# Patient Record
Sex: MALE | Race: AMERICAN INDIAN OR ALASKA NATIVE
[De-identification: names, ages, dates, MRNs, and addresses within clinical notes are randomized per-mention and may not be internally consistent; named-entity substitution may affect disease eponyms.]

---

## 2018-06-30 ENCOUNTER — HOSPITAL ENCOUNTER (EMERGENCY)
Dept: HOSPITAL 42 - ED | Age: 23
Discharge: HOME | End: 2018-06-30
Payer: MEDICAID

## 2018-06-30 VITALS — DIASTOLIC BLOOD PRESSURE: 60 MMHG | HEART RATE: 60 BPM | SYSTOLIC BLOOD PRESSURE: 125 MMHG | OXYGEN SATURATION: 100 %

## 2018-06-30 VITALS — BODY MASS INDEX: 23.7 KG/M2

## 2018-06-30 VITALS — RESPIRATION RATE: 18 BRPM | TEMPERATURE: 98.5 F

## 2018-06-30 DIAGNOSIS — S40.811A: ICD-10-CM
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
## 2018-06-30 DIAGNOSIS — M25.511: ICD-10-CM

## 2018-06-30 DIAGNOSIS — M25.531: Primary | ICD-10-CM

## 2018-06-30 DIAGNOSIS — M54.5: ICD-10-CM
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
## 2018-06-30 DIAGNOSIS — Z23: ICD-10-CM

## 2018-06-30 NOTE — ED PDOC
Arrival/HPI





- General


Chief Complaint: Trauma


Time Seen by Provider: 06/30/18 16:19


Historian: Patient





- History of Present Illness


Narrative History of Present Illness (Text): 





06/30/18 18:05


22yr old male presents today with right shoulder pain, right hand wrist and 

forearm pain and left knee pain status post injury. Patient states he was 

getting off of a bus and fell forward landing on his hands and injuring the 

left knee. Patient is complaining of low back pain. He is complaining of pain 

in the right shoulder and right forearm hand and wrist. No medications were 

taken for pain. The incident occurred prior to arrival. Patient denies loss of 

consciousness. He denies headaches dizziness or weakness. He denies any neck 

pain. He denies bladder or bowel incontinence. He denies numbness weakness or 

tingling in the lower extremities. Patient states his left knee is feeling 

better. Patient states he still has some pain with flexion of the knee. Patient 

states his back pain has improved while in the emergency room. Patient states 

his shoulder hurts the most. Patient states he has abrasions on the wrists 

bilaterally and on the right hand.


Time/Duration: Prior to Arrival


Symptom Onset: Sudden


Symptom Course: Improving


Quality: Aching


Severity Level: Mild





Past Medical History





- Provider Review


Nursing Documentation Reviewed: Yes





- Travel History


Have you recently traveled outside [place] w/in the past 3 mons?: No





- Tetanus Immunization


Tetanus Immunization: Unknown





- Psychiatric


Hx Substance Use: Yes





Family/Social History





- Physician Review


Nursing Documentation Reviewed: Yes


Family/Social History: Unknown Family HX


Smoking Status: Never Smoked


Hx Alcohol Use: No


Hx Substance Use: Yes


Substance used: marijuana





Allergies/Home Meds


Allergies/Adverse Reactions: 


Allergies





cockroach Adverse Reaction (Uncoded 06/30/18 16:48)


 SWELLING











Review of Systems





- Review of Systems


Constitutional: absent: Fatigue, Fevers


Eyes: absent: Vision Changes


Respiratory: absent: SOB, Cough


Cardiovascular: absent: Chest Pain, Palpitations


Gastrointestinal: absent: Abdominal Pain, Constipation, Diarrhea, Nausea, 

Vomiting


Genitourinary Male: absent: Dysuria, Frequency, Urinary Output Changes


Musculoskeletal: Arthralgias, Back Pain.  absent: Neck Pain


Skin: Other (abrasions right hand/ bilateral wrists)


Neurological: absent: Headache, Dizziness


Psychiatric: absent: Anxiety, Depression





Physical Exam


Vital Signs Reviewed: Yes


Vital Signs











  Temp Pulse Resp BP Pulse Ox


 


 06/30/18 16:18  98.5 F  72  18  133/100 H  99











Temperature: Afebrile


Blood Pressure: Hypertensive


Pulse: Regular


Respiratory Rate: Normal


Appearance: Positive for: Well-Appearing, Non-Toxic, Comfortable


Pain Distress: None


Mental Status: Positive for: Alert and Oriented X 3





- Systems Exam


Head: Present: Atraumatic


Pupils: Present: PERRL


Extroacular Muscles: Present: EOMI


Mouth: Present: Moist Mucous Membranes


Nose (External): Present: Atraumatic


Neck: Present: Normal Range of Motion, Trachea Midline.  No: MIDLINE TENDERNESS

, Paraspinal Tenderness


Respiratory/Chest: Present: Clear to Auscultation, Good Air Exchange.  No: 

Respiratory Distress, Accessory Muscle Use, Tender to Palpation (no rib 

tenderness, no step offs, no ecchymosis. )


Cardiovascular: Present: Regular Rate and Rhythm, Normal S1, S2.  No: Murmurs


Abdomen: Present: Tenderness, Other (no ecchymosis; ).  No: Distention, 

Peritoneal Signs, Rebound, Guarding


Back: Present: Normal Inspection, Paraspinal Tenderness (+ b/l lumbar tenderness

).  No: Midline Tenderness


Upper Extremity: Present: Normal ROM, NORMAL PULSES, Tenderness (right arm; + 

ttp over dorsal forearm; 2 superficial round dime sized abrasions noted to 

distal forearm; minimal tenderness, full rom of right wrist, superifical 

abraions noted to 2nd 4th and 5th mcp along the dorsal aspect; no tenderness. 

full rom of hand and wrist. no snuff box tenderness. left arm; non tender; full 

rom, small round abrasion noted to medial aspect of distal forearm; no active 

bleeding; no surrounding erythema; no tenderness. ), Neurovascularly Intact, 

Capillary Refill < 2s, Other (right shoulder; + ttp over posterior and lateral 

aspect; no edema, no erythema; no ecchymosis; full rom of shoulder).  No: 

Swelling, Erythema


Lower Extremity: Present: Normal Inspection, Normal ROM, Tenderness (left knee; 

no edema, no erythema, no ecchymosis; full rom of knee. minimal tenderness 

noted to medial aspect of knee. no calf tenderness. ), Neurovascularly Intact.  

No: Swelling, Erythema, Deformity, Capillary Refill < 2 s


Neurological: Present: GCS=15, Speech Normal


Skin: Present: Warm, Dry, Normal Color


Psychiatric: Present: Alert, Oriented x 3





Medical Decision Making


ED Course and Treatment: 





06/30/18 18:46


22yr old male with right shoulder, right arm and left knee and low back pain s/

p fall. 





pt non toxic well appearing; no distress. elevated BP. 








xray right shoulder: no fracture


xray right wrist: no fracture


xrays right hand: no fracture





xray right forearm; pt refused


xrays left knee. pt refused








tetanus updated


pt refused toradol for pain. 





motrin given po 








pt reassessment;pt is non toxic well appearing; no distress. stable vitals. 








06/30/18 19:13


pt non toxic well appearing; no distress. feeling better. wants to go home. 





pt placed in right thumb spica splint





Patient was advised to follow-up with the orthopedist within the next 2 days. 

Patient was advised immediate return if symptoms worsen persist or if new 

concerning symptoms develop





i advised the patient that although the xrays show no fracture; there is still 

a possibility for ligamentous or tendon injury the patient must see the 

orthopedist for further evaluation.





Patient verbalizes understanding of discharge instructions and need for 

immediate followup.








all aspects of this case were discussed the attending of record. 








impression; shoulder pain, wrist pain, knee pain, back pain s/p fall


motrin every 6 hours as needed for pain


flexeril 1 tablet every 8 hours as needed for muscle spasms: may cause 

drowsiness


Follow-up with the orthopedist within the next 2 days


Follow-up with primary care physician within the next 2 days


Return if symptoms worsen persist or if new concerning symptoms develop








Reassessment Condition: Re-examined, Improved





- RAD Interpretation


Radiology Orders: 








06/30/18 17:29


FOREARM RIGHT [RAD] Stat 


HAND RIGHT 3 VIEWS [RAD] Stat 


SHOULDER RIGHT [RAD] Stat 


WRIST, RIGHT 3 VIEWS [RAD] Stat 





06/30/18 17:30


KNEE WITH PATELLA LEFT 3 VIEW [RAD] Stat 


LS SPINE WITH OBL > 18 YRS OLD [RAD] Stat 














- Medication Orders


Current Medication Orders: 











Discontinued Medications





Ketorolac Tromethamine (Toradol)  30 mg IM STAT STA


   Stop: 06/30/18 17:30


   Last Admin: 06/30/18 18:32 Dose:  Not Given


   Non-Admin Reason: Patient Refused





Tetanus/Reduced Diphtheria/Acell Pertussis (Boostrix Vaccine Inj)  0.5 ml IM 

.ONCE ONE


   Stop: 06/30/18 17:30


   Last Admin: 06/30/18 18:31  Dose: 0.5 ml





MAR Immunization Data


 Document     06/30/18 18:31  LA  (Rec: 06/30/18 18:32  LA  [id number])


     Immunization Data


      Vaccine Information Sheet Given            Yes


Immunization Registry


 Document     06/30/18 18:31  LA  (Rec: 06/30/18 18:32  [place]  [id number])


      Immunization Registry Consent Date         06/30/18














Disposition/Present on Arrival





- Present on Arrival


Any Indicators Present on Arrival: No


History of DVT/PE: No


History of Uncontrolled Diabetes: No


Urinary Catheter: No


History of Decub. Ulcer: No


History Surgical Site Infection Following: None





- Disposition


Have Diagnosis and Disposition been Completed?: Yes


Diagnosis: 


 Shoulder pain, Back pain, Wrist pain, Abrasion of right arm





Disposition: HOME/ ROUTINE


Disposition Time: 19:17


Patient Plan: Discharge


Patient Problems: 


 Current Active Problems











Problem Status Onset


 


Abrasion of right arm Acute  


 


Back pain Acute  


 


Shoulder pain Acute  


 


Wrist pain Acute  











Condition: GOOD


Discharge Instructions (ExitCare):  Skin Abrasions (DC), Shoulder Pain (DC), 

Low Back Pain  (DC)


Additional Instructions: 


motrin every 6 hours as needed for pain


flexeril 1 tablet every 8 hours as needed for muscle spasms: may cause 

drowsiness


Follow-up with the orthopedist within the next 2 days


Follow-up with primary care physician within the next 2 days


Return if symptoms worsen persist or if new concerning symptoms develop


Prescriptions: 


Cyclobenzaprine [Cyclobenzaprine HCl] 10 mg PO Q8 #10 tab


Ibuprofen [Motrin] 600 mg PO Q6H PRN #20 tab


 PRN Reason: pain/fever reduction


Referrals: 


Tay Sanford MD [Staff Provider] - Follow up with primary


[facility] [Outside] - Follow up with primary


[facility] [Outside] - Follow up with primary


[facility] [Outside] - Follow up with primary


Ginny Arzola MD [Staff Provider] - Follow up with primary


Forms:  b-datum (English), WORK NOTE

## 2018-07-01 NOTE — RAD
PROCEDURE:  Radiographs of the Right Shoulder



HISTORY:

fall







COMPARISON:

No prior.



FINDINGS:



BONES:

Normal. No fracture.



JOINTS:

Normal. Glenohumeral and acromioclavicular joints preserved. No 

osteoarthritis.



SOFT TISSUES:

Normal.



OTHER FINDINGS:

None.



IMPRESSION:

Normal radiographs of the right shoulder.

## 2018-07-01 NOTE — RAD
PROCEDURE:  Right Wrist Radiographs.







HISTORY:

fall pain



COMPARISON:

None.



FINDINGS:



BONES:

Normal. No fracture.



JOINTS:

Normal. No dislocation. 



SOFT TISSUES:

Normal. 



OTHER FINDINGS:

None.



IMPRESSION:

Normal right wrist radiographs.

## 2018-07-01 NOTE — RAD
PROCEDURE:  Right Hand Radiographs.



HISTORY:

fall , pain



COMPARISON:

None.



FINDINGS:



BONES:

Normal. No fracture. 



JOINTS:

Normal. No osteoarthritic changes. 



SOFT TISSUES:

Normal. 



OTHER FINDINGS:

None.



IMPRESSION:

Normal right hand radiographs.

## 2018-07-01 NOTE — RAD
PROCEDURE:  Radiographs of the Lumbar Spine.



HISTORY:

Pain. No history of recent/ related trauma provided







COMPARISON:

No prior.



FINDINGS:



BONES:

Normal alignment. No listhesis. No fracture.



DISC SPACES:

Unremarkable.



OTHER FINDINGS:

None.



IMPRESSION:

Unremarkable radiographs of the lumbar spine.